# Patient Record
(demographics unavailable — no encounter records)

---

## 2024-11-20 NOTE — DISCUSSION/SUMMARY
[Medication Risks Reviewed] : Medication risks reviewed [Surgical risks reviewed] : Surgical risks reviewed [de-identified] : L4-5 spondylolisthesis with severe stenosis. Here for 2nd opinion. Discussed all options. Discussed L4-5 laminectomy and fusion. Risks of surgery include infection, dural tear, nerve root injury, reherniation, future leg pain, future back pain, retained fragment, hematoma, urinary retention, worsening leg symptoms, foot drop, anesthetic risks, blood transfusion risks, positioning pain, visceral vascular injury, deep vein thrombosis, pulmonary embolus, and death. Somatosensory evoked potentials and EMG monitoring will be used. Surgery will involve lumbar decompression and fusion with or without instrumentation, local auto bone fusion, demineralized bone matrix, and neural monitoring. Patient will need spinal orthosis pre and post operatively. All risks were explained not exclusive to the ones mentioned alternatives were discussed and all questions were answered the patient agrees and understands the above and is in complete agreement with the plan. All options discussed including rest, medicine, home exercise, acupuncture, Chiropractic care, Physical Therapy, Pain management, and last resort surgery. All questions were answered, all alternatives discussed, and the patient is in complete agreement with the treatment plan which the patient contributed to and discussed with me through the shared decision-making process. Follow-up appointment as instructed. Any issues and the patient will call or come in sooner. Wife agrees with plan.

## 2024-11-20 NOTE — PHYSICAL EXAM
[Cane] : ambulates with cane [Lopez's Sign] : negative Lopez's sign [Pronator Drift] : negative pronator drift [SLR] : negative straight leg raise [de-identified] : 5 out of 5 motor strength, sensation is intact and symmetrical full range of motion flexion extension and rotation, no palpatory tenderness full range of motion of hips knees shoulders and elbows (all four extremities), no atrophy, negative straight leg raise, no pathological reflexes, no swelling, normal ambulation, no apparent distress skin is intact, no palpable lymph nodes, no upper or lower extremity instability, alert and oriented x3 and normal mood. Normal finger-to nose test.  No upper motor neuron findings. [de-identified] : I reviewed, interpreted and clinically correlated the following outside imaging studies, MRI Lumbar 2/21/24 ([facility]) - unchanged severe central spinal canal stenosis at L4-5 secondary to severe facet arthropathy and grade 1 degenerative anterolisthesis. There is complete effacement of the subarachnoid space at this level.

## 2024-11-20 NOTE — ADDENDUM
[FreeTextEntry1] : This note was written by Burton Moran on 11/20/2024 acting as scribe for Dr. Fran Coppola M.D.  I, Fran Coppola MD, have read and attest that all the information, medical decision making and discharge instructions within are true and accurate.

## 2024-11-20 NOTE — HISTORY OF PRESENT ILLNESS
[Stable] : stable [de-identified] : 82 year old male presents for initial evaluation of chronic lower back pain. He has pain that radiates from the lower back to the buttocks, posterior thighs and calves.  Has peripheral neuropathy of the legs from the knees to the feet.  States that his legs feel weak. States that standing and walking aggravates the pain.  Sitting and laying down alleviates the pain.  He takes cymbalta and gabapentin for the neuropathy. He takes tylenol and naproxen for the pain. Has tried PT over the years, most recently since February but did not feel improvement. Has also tried Chiropractic care but no relief. Acupuncture did not provide relief. S/P LESI from [id number], most recent injection in February with Dr. Hanson. Has not felt relief with the injection. He had seen Dr. Ambrocio at [facility] who offered L4-5 laminectomy and fusion. He presents today for a 2nd opinion. Has MRI Lumbar done in February. Ambulates with cane. PMHx: BPH No fever, chills, sweats, nausea/vomiting. No bowel or bladder dysfunction, no recent weight loss or gain. No night pain. This history is in addition to the intake form that I personally reviewed.